# Patient Record
(demographics unavailable — no encounter records)

---

## 2024-10-21 NOTE — HISTORY OF PRESENT ILLNESS
[de-identified] : Patient is a 20 year old female who presents for follow up evaluation of acute low back pain. Denies any bowel/bladder incontinence. Denies any saddle anesthesia.  She states that her back pain has improved.  She denies any radiating type symptoms.  She is anxious to safely increase her activities at this point.  09.27.24: Patient is a 20 year old female who presents for follow up evaluation of acute low back pain. Denies any bowel/bladder incontinence. Denies any saddle anesthesia. She denies any pain going down her legs. Recommended to take 4 weeks off of swimming.   9.20.24: A 20 year old female presents an initial visit for acute lower back pain which has been ongoing for the past few weeks. She notes that about 5-6 years ago, she was dx with bulging disc in her lower back, where she had X-Rays and attended physical therapy in the past, which did alleviate her symptoms. She states that her symptoms are most severe night, where gets a throbbing sharp pain across her back. She denies any radiating symptoms to her legs. The patient denies any symptoms of numbness, tingling, or weakness. She notes that she is a part of her swim team and had recently done weight training with the team, which she is unsure if it exacerbated her symptoms.

## 2024-10-21 NOTE — PHYSICAL EXAM
[de-identified] : Gait - Normal Station - Normal  Sagittal balance - Normal  Compensatory mechanism? - None  Heel walk - Normal  Toe walk - Normal  Reflexes Patellar - normal Gastroc - normal Clonus - No  Hip Exam - Normal  Straight leg raise - none  Pulses - 2+ dp/pt  Range of motion - normal  Sensation Sensation intact to light touch in L1, L2, L3, L4, L5 and S1 dermatomes bilaterally  Motor IP Quad HS TA Gastroc EHL Right 5/5 5/5 5/5 5/5 5/5 5/5 Left 5/5 5/5 5/5 5/5 5/5 5/5  [de-identified] : Lumbar MRI Moderate, mild stenosis at L4-L5 Disc protrusion L4-L5  Previous Imaging  AP & Lateral X-Rays of the Lumbar Spine performed today shows Multilevel Degenerative Disc Disease  No instability Facet arthropathy

## 2024-10-21 NOTE — ADDENDUM
[FreeTextEntry1] : I, Karthik Wyatt, acted solely as a scribe for Dr. John Gutierres MD on this date 10/21/2024  All medical record entries made by the Scribe were at my, Dr. John Gutierres MD., direction and personally dictated by me on 10/21/2024. I have reviewed the chart and agree that the record accurately reflects my personal performance of the history, physical exam, assessment and plan. I have also personally directed, reviewed, and agreed with the chart.

## 2024-10-21 NOTE — ASSESSMENT
[FreeTextEntry1] : Had a long discussion with the patient regarding her treatment plan and diagnosis at this point we will focus on conservative care.  This will include continued physical therapy.  I will have her follow-up with me in approximately 6 weeks on an as-needed basis.  She will slowly increase her activities over the coming weeks as well.